# Patient Record
Sex: FEMALE | Race: WHITE | NOT HISPANIC OR LATINO | Employment: FULL TIME | URBAN - METROPOLITAN AREA
[De-identification: names, ages, dates, MRNs, and addresses within clinical notes are randomized per-mention and may not be internally consistent; named-entity substitution may affect disease eponyms.]

---

## 2017-05-22 ENCOUNTER — OFFICE VISIT (OUTPATIENT)
Dept: ORTHOPEDICS | Facility: CLINIC | Age: 52
End: 2017-05-22
Payer: COMMERCIAL

## 2017-05-22 ENCOUNTER — HOSPITAL ENCOUNTER (OUTPATIENT)
Dept: RADIOLOGY | Facility: HOSPITAL | Age: 52
Discharge: HOME | End: 2017-05-22
Attending: ORTHOPAEDIC SURGERY
Payer: COMMERCIAL

## 2017-05-22 VITALS — BODY MASS INDEX: 36.62 KG/M2 | HEIGHT: 63 IN | WEIGHT: 206.69 LBS

## 2017-05-22 DIAGNOSIS — M84.375A STRESS FRACTURE OF METATARSAL BONE, LEFT, INITIAL ENCOUNTER: ICD-10-CM

## 2017-05-22 DIAGNOSIS — L98.9 FOOT LESION: ICD-10-CM

## 2017-05-22 DIAGNOSIS — L98.9 FOOT LESION: Primary | ICD-10-CM

## 2017-05-22 PROCEDURE — 99243 OFF/OP CNSLTJ NEW/EST LOW 30: CPT | Mod: S$GLB,,, | Performed by: ORTHOPAEDIC SURGERY

## 2017-05-22 PROCEDURE — 99999 PR PBB SHADOW E&M-NEW PATIENT-LVL I: CPT | Mod: PBBFAC,,, | Performed by: ORTHOPAEDIC SURGERY

## 2017-05-22 PROCEDURE — 73630 X-RAY EXAM OF FOOT: CPT | Mod: 26,LT,, | Performed by: RADIOLOGY

## 2017-05-22 PROCEDURE — 73630 X-RAY EXAM OF FOOT: CPT | Mod: TC,LT

## 2017-05-22 RX ORDER — LISINOPRIL 10 MG/1
10 TABLET ORAL DAILY
COMMUNITY

## 2017-05-22 RX ORDER — UBIDECARENONE 75 MG
500 CAPSULE ORAL DAILY
COMMUNITY

## 2017-05-22 RX ORDER — VITAMIN E 268 MG
400 CAPSULE ORAL DAILY
COMMUNITY

## 2017-05-22 RX ORDER — CALCIUM CARBONATE 600 MG
600 TABLET ORAL 2 TIMES DAILY WITH MEALS
COMMUNITY

## 2017-05-22 RX ORDER — CHOLECALCIFEROL (VITAMIN D3) 25 MCG
1000 TABLET ORAL DAILY
COMMUNITY

## 2017-05-22 NOTE — LETTER
May 22, 2017        Hola Polk MD  [address]  [address] [place] [id number]             [facility]  [address]  [place] [address]  Phone: [phone]   Patient: Bere Doshi   MR Number: [id number]   YOB: 1965   Date of Visit: 5/22/2017       Dear Dr. Polk:    Thank you for referring Bere Doshi to me for evaluation. Attached you will find relevant portions of my assessment and plan of care.    AP, lateral, and oblique radiographs of the left foot were ordered and personally reviewed with the patient today.  These show no destructive changes.    I have reviewed her MRI.  She has marked edema around an apparent base of 2nd MT stress fracture.  However, there is no marrow replacement process.                                                                                                       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  I have recommended adding crutches to fully unweight the metatarsal.        If you have questions, please do not hesitate to call me. I look forward to following Bere Doshi along with you.    Sincerely,      Say Kemp MD            CC  No Recipients    Enclosure

## 2017-05-22 NOTE — PROGRESS NOTES
CHIEF COMPLAINT:  Left second metatarsal mass.                     Seen in consultation from Dr. Polk                                   HISTORY OF PRESENT ILLNESS:  The patient is a 51 y.o. female  who presents  for evaluation of her left second metatarsal.  She has had activity related pain. She was diagnosed with a stress fracture, but there is concern of underlying neoplasm.  She had the acute onset of pain after a workout in November.  She has been using a bone stimulator for 6 weeks.  She has been in a walking boot, but not on crutches.    Pain Duration: 6 months  Pain Quality: burning  Pain Context:unchanged  Pain Timing: constant  Pain Location: dorsal forefoot and midfoot  Pain Severity: moderate    Night pain: Positive    She  presents for further treatment recommendations.   She denies cough, sputum production, shortness of breath,   fever, chills, night sweats or weight loss.  She denies distal paresthesias.  She has no history of prior trauma.                                                                                                                       PAST MEDICAL HISTORY:  No past medical history on file.                                                                                                                  PAST SURGICAL HISTORY:  No past surgical history on file.                                                                                                             SOCIAL HISTORY:  Reviewed per EPIC history for tobacco or alcohol use and she  is an active  51 y.o.  female                                                                             FAMILY MEDICAL HISTORY:  family history is not on file.                                                                                                                                                    PHYSICAL EXAMINATION:                                                        GENERAL:  A well-developed, well-nourished 51 y.o. female  who is alert and       oriented in no acute distress.      Gait: She  walks with a slight limp.                   EXTREMITIES:  Examination of lower extremities reveals there is not visible mass or deformity    The skin over both lower extremities is normal and unremarkable.  She has a   painless range of motion of the hips, knees and ankles            bilaterally.  Sensation is intact in both lower extremities.  There are no motor deficits in the lower  extremities bilaterally.  Pedal pulses are palpable distally bilaterally.    She has no calf tenderness to palpation nor edema.  She does not havea palpable mass, but has tenderness to palpation 2nd webspace and base of second MT.    AP, lateral, and oblique radiographs of the left foot were ordered and personally reviewed with the patient today.  These show no destructive changes.    I have reviewed her MRI.  She has marked edema around an apparent base of 2nd MT stress fracture.  However, there is no marrow replacement process.                                                                                                       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  I have recommended adding crutches to fully unweight the metatarsal.

## 2017-05-22 NOTE — ADDENDUM NOTE
Encounter addended by: Janelle Emery MA on: 5/22/2017  4:03 PM<BR>    Actions taken: Vitals modified

## 2017-05-22 NOTE — LETTER
May 22, 2017      Hola Polk MD  [address]   [address]  [facility] [address]           [facility]  [address]  [place] [address]  Phone: [phone]          Patient: Bere Doshi   MR Number: [id number]   YOB: 1965   Date of Visit: 5/22/2017       Dear Dr. Hola Polk:    Thank you for referring Bere Doshi to me for evaluation. Attached you will find relevant portions of my assessment and plan of care.    If you have questions, please do not hesitate to call me. I look forward to following Bere Doshi along with you.    Sincerely,    Say Kemp MD    Enclosure  CC:  No Recipients    If you would like to receive this communication electronically, please contact [email or web address] or [phone] to request more information on [employer] access.    For providers and/or their staff who would like to refer a patient to [facility], please contact us through our one-stop-shop provider referral line, [facility] , at [phone].    If you feel you have received this communication in error or would no longer like to receive these types of communications, please e-mail [email or web address]

## 2020-10-15 ENCOUNTER — CLINICAL SUPPORT (OUTPATIENT)
Dept: URGENT CARE | Facility: CLINIC | Age: 55
End: 2020-10-15
Payer: COMMERCIAL

## 2020-10-15 VITALS — TEMPERATURE: 98 F

## 2020-10-15 DIAGNOSIS — Z20.822 EXPOSURE TO COVID-19 VIRUS: Primary | ICD-10-CM

## 2020-10-15 LAB
CTP QC/QA: YES
SARS-COV-2 RDRP RESP QL NAA+PROBE: NEGATIVE

## 2020-10-15 PROCEDURE — U0002 COVID-19 LAB TEST NON-CDC: HCPCS | Mod: QW,S$GLB,, | Performed by: NURSE PRACTITIONER

## 2020-10-15 PROCEDURE — U0002: ICD-10-PCS | Mod: QW,S$GLB,, | Performed by: NURSE PRACTITIONER
